# Patient Record
Sex: FEMALE | Race: OTHER | Employment: UNEMPLOYED | URBAN - METROPOLITAN AREA
[De-identification: names, ages, dates, MRNs, and addresses within clinical notes are randomized per-mention and may not be internally consistent; named-entity substitution may affect disease eponyms.]

---

## 2021-03-31 ENCOUNTER — OFFICE VISIT (OUTPATIENT)
Dept: OBGYN CLINIC | Facility: CLINIC | Age: 26
End: 2021-03-31
Payer: COMMERCIAL

## 2021-03-31 VITALS — SYSTOLIC BLOOD PRESSURE: 101 MMHG | DIASTOLIC BLOOD PRESSURE: 61 MMHG | WEIGHT: 113 LBS

## 2021-03-31 DIAGNOSIS — N92.6 IRREGULAR MENSES: Primary | ICD-10-CM

## 2021-03-31 PROCEDURE — 99203 OFFICE O/P NEW LOW 30 MIN: CPT | Performed by: NURSE PRACTITIONER

## 2021-03-31 PROCEDURE — 3078F DIAST BP <80 MM HG: CPT | Performed by: NURSE PRACTITIONER

## 2021-03-31 PROCEDURE — 3074F SYST BP LT 130 MM HG: CPT | Performed by: NURSE PRACTITIONER

## 2021-03-31 NOTE — PROGRESS NOTES
[facility], [facility]  Obstetrics and Gynecology  Focused Gynecology Problem Visit  Sabrina Vyas, MSN, APRN, FNP-BC    HPI   Robert Gill is a 27year old female who presents to the clinic for Gyn Problem (Irregular menstraul cycles, New Pt)  .      Patient pres Worried About [facility] in the Last Year:       Ran Out of Food in the Last Year:   Transportation Needs:       Lack of Transportation (Medical):       Lack of Transportation (Non-Medical):   Physical Activity:       Days of Exercise per Week: test results.   - OCP information reviewed with pt including administration, use, risks, benefits, SE, back up contraception, STI prevention, and ACHES.  - To do fasting labs tomorrow AM - day 3 of cycle  - TSH, FSH, LH, prolactin, testosterone  - RTC in 1

## 2021-04-01 ENCOUNTER — LAB ENCOUNTER (OUTPATIENT)
Dept: LAB | Facility: HOSPITAL | Age: 26
End: 2021-04-01
Attending: NURSE PRACTITIONER
Payer: COMMERCIAL

## 2021-04-01 DIAGNOSIS — N92.6 IRREGULAR MENSES: ICD-10-CM

## 2021-04-01 PROCEDURE — 84402 ASSAY OF FREE TESTOSTERONE: CPT

## 2021-04-01 PROCEDURE — 84403 ASSAY OF TOTAL TESTOSTERONE: CPT

## 2021-04-01 PROCEDURE — 83002 ASSAY OF GONADOTROPIN (LH): CPT

## 2021-04-01 PROCEDURE — 84146 ASSAY OF PROLACTIN: CPT

## 2021-04-01 PROCEDURE — 84443 ASSAY THYROID STIM HORMONE: CPT

## 2021-04-01 PROCEDURE — 83001 ASSAY OF GONADOTROPIN (FSH): CPT

## 2021-04-01 PROCEDURE — 36415 COLL VENOUS BLD VENIPUNCTURE: CPT

## 2021-04-07 ENCOUNTER — OFFICE VISIT (OUTPATIENT)
Dept: OBGYN CLINIC | Facility: CLINIC | Age: 26
End: 2021-04-07
Payer: COMMERCIAL

## 2021-04-07 VITALS — SYSTOLIC BLOOD PRESSURE: 111 MMHG | WEIGHT: 113 LBS | DIASTOLIC BLOOD PRESSURE: 64 MMHG

## 2021-04-07 DIAGNOSIS — Z01.419 ENCOUNTER FOR WELL WOMAN EXAM WITH ROUTINE GYNECOLOGICAL EXAM: Primary | ICD-10-CM

## 2021-04-07 DIAGNOSIS — Z30.09 COUNSELING FOR INITIATION OF BIRTH CONTROL METHOD: ICD-10-CM

## 2021-04-07 DIAGNOSIS — N92.6 IRREGULAR MENSES: ICD-10-CM

## 2021-04-07 DIAGNOSIS — Z11.3 ENCOUNTER FOR SCREENING EXAMINATION FOR SEXUALLY TRANSMITTED DISEASE: ICD-10-CM

## 2021-04-07 PROCEDURE — 99395 PREV VISIT EST AGE 18-39: CPT | Performed by: NURSE PRACTITIONER

## 2021-04-07 PROCEDURE — 3078F DIAST BP <80 MM HG: CPT | Performed by: NURSE PRACTITIONER

## 2021-04-07 PROCEDURE — 3074F SYST BP LT 130 MM HG: CPT | Performed by: NURSE PRACTITIONER

## 2021-04-07 RX ORDER — LEVONORGESTREL AND ETHINYL ESTRADIOL 0.1-0.02MG
1 KIT ORAL DAILY
Qty: 1 PACKAGE | Refills: 2 | Status: SHIPPED | OUTPATIENT
Start: 2021-04-07 | End: 2021-06-21

## 2021-04-07 NOTE — PROGRESS NOTES
[facility], [facility]  Obstetrics and Gynecology  Annual Gynecology Visit  Tonja Jose, MSN, APRN, FNP-BC    HPI   Rafael Gomez is a 27year old female who presents for her annual gynecology exam and complaints of irregular menses.  She had her first period at Negative. Eyes: Negative. Respiratory: Negative. Cardiovascular: Negative. Gastrointestinal: Negative. Endocrine: Negative. Genitourinary: Positive for menstrual problem. Musculoskeletal: Negative. Skin: Negative.     Allergic/Immunol motion. Cervical back: Normal range of motion and neck supple. Lymphadenopathy:      Cervical: No cervical adenopathy. Upper Body:      Right upper body: No axillary adenopathy. Left upper body: No axillary adenopathy.       Lower Body: No up on first week of pills. Advised taking 10-12 hours apart and eating something to prevent nausea. - Discussed diet, exercise, safe sex/contraception, pap smear timing, and self breast exam  - Age related health screenings reviewed.    - Labs - Pap, GC/C

## 2021-04-07 NOTE — PATIENT INSTRUCTIONS
ORAL CONTRACEPTIVE PILL INSTRUCTION    The name of my oral contraceptive pill is levonorgestrel/ethinyl estradiol    When do I start my “pills”? 1. Today    What time should I take my pill? 1. Take your pill the same time every day.   This will improve it contraception? 1. Use a back-up with the first pack of pills  2. Use a back-up if you miss more than one pill in a pack  3. Use a back-up if you have bleeding during your active pills  4. Use a back-up for safe sexual practice, to prevent disease  5.  Use

## 2021-04-08 ENCOUNTER — TELEPHONE (OUTPATIENT)
Dept: OBGYN CLINIC | Facility: CLINIC | Age: 26
End: 2021-04-08

## 2021-04-08 RX ORDER — ONDANSETRON 4 MG/1
4 TABLET, FILM COATED ORAL EVERY 8 HOURS PRN
Qty: 10 TABLET | Refills: 0 | Status: SHIPPED | OUTPATIENT
Start: 2021-04-08

## 2021-04-08 NOTE — TELEPHONE ENCOUNTER
Spoke with pt who states she took her first dose of OCPs yesterday morning with food and had no problems. Had only a small bowl of soup with night time dose and woke up during the night and proceeded to vomit 7-8 times. Feels fine today.   Will send Carolyn Miranda

## 2021-04-08 NOTE — TELEPHONE ENCOUNTER
Pt voices since taking her BC pills she has been vomiting. Pt has taken 2 pills so far. No other symptoms. Advised pt to quit taking her [facility] until issue addressed with Elayne Roldan. Pt voices understanding.

## 2021-04-08 NOTE — TELEPHONE ENCOUNTER
----- Message from Robert Gill sent at 4/8/2021 10:06 AM CDT -----  Regarding: RE: Preliminary Vaginal Culture  Contact: [phone]    Phil Vu,  Thank you so much for your message. I have started taking pills and I had been vomiting since last night.

## 2021-04-28 RX ORDER — LEVONORGESTREL AND ETHINYL ESTRADIOL 0.1-0.02MG
1 KIT ORAL DAILY
Qty: 1 PACKAGE | Refills: 2 | OUTPATIENT
Start: 2021-04-28

## 2021-06-21 RX ORDER — LEVONORGESTREL AND ETHINYL ESTRADIOL 0.1-0.02MG
1 KIT ORAL DAILY
Qty: 28 TABLET | Refills: 0 | Status: SHIPPED | OUTPATIENT
Start: 2021-06-21

## (undated) NOTE — MR AVS SNAPSHOT
After Visit Summary   4/7/2021    Teodora Harris    MRN: [id number]           Visit Information     Date & Time  4/7/2021  9:30 AM Provider  [address]  Phone  [phone]      Y it’s effectiveness and will help you remember to take your pill daily.’  2. If you have nausea when taking your pill, take it at bedtime with some food    What kind of side effects can occur and what can I do about them? 1.  Nausea – take the pill with rayne back-up if you are taking a medication that decreases the pills effectiveness    DANGER SIGNS – CALL THE OFFICE [phone]   A – Abdominal pain (severe)  C – Chest pain (severe)  H – Headaches (severe)  E – Eye problems (loss of vision, blurring of visio Treatment for acute illness   or injury that are   non-life-threatening.   Also available by appointment     Average cost  $70*       [address]   Monday – Friday  10:00 am –